# Patient Record
Sex: FEMALE | Race: WHITE | URBAN - METROPOLITAN AREA
[De-identification: names, ages, dates, MRNs, and addresses within clinical notes are randomized per-mention and may not be internally consistent; named-entity substitution may affect disease eponyms.]

---

## 2020-10-29 ENCOUNTER — OFFICE VISIT (OUTPATIENT)
Dept: URBAN - METROPOLITAN AREA CLINIC 27 | Facility: CLINIC | Age: 85
End: 2020-10-29
Payer: MEDICARE

## 2020-10-29 PROCEDURE — 92134 CPTRZ OPH DX IMG PST SGM RTA: CPT | Performed by: OPHTHALMOLOGY

## 2020-10-29 ASSESSMENT — INTRAOCULAR PRESSURE
OD: 9
OS: 10

## 2020-10-29 NOTE — IMPRESSION/PLAN
Impression: Exudative age-related macular degeneration, bilateral, with active choroidal neovascularization: H35.3231. OU.
s/p Avastin OU; last OS 9/3/2020, OD 9/3/2020 Plan: Exam and OCT continue to show stable IRF/SRF OU, resolved SRH OS s/p Avastin OU. I have recommended continuing anti-VEGF treatment OU today. R/B/A discussed. The patient elects to proceed with intravitreal Avastin OU today, which was performed in the clinic without complication. last full DFE OU 6/16/20 RTC 7-8 weeks OCT OU re-eval, DFE OU (semiannual)

## 2020-12-17 ENCOUNTER — OFFICE VISIT (OUTPATIENT)
Dept: URBAN - METROPOLITAN AREA CLINIC 27 | Facility: CLINIC | Age: 85
End: 2020-12-17
Payer: MEDICARE

## 2020-12-17 PROCEDURE — 92014 COMPRE OPH EXAM EST PT 1/>: CPT | Performed by: OPHTHALMOLOGY

## 2020-12-17 PROCEDURE — 92134 CPTRZ OPH DX IMG PST SGM RTA: CPT | Performed by: OPHTHALMOLOGY

## 2020-12-17 ASSESSMENT — INTRAOCULAR PRESSURE
OS: 7
OD: 9

## 2020-12-17 NOTE — IMPRESSION/PLAN
Impression: Exudative age-related macular degeneration, bilateral, with active choroidal neovascularization: H35.3231. OU.
s/p Avastin OU; last OS 10/29/2020, OD 10/29/2020 Plan: Both eyes are due for full dilated semiannual exam today. Exam and OCT continue to show stable, mild IRF/SRF OU, resolved SRH OS s/p Avastin OU. I have recommended continuing anti-VEGF treatment OU today. R/B/A discussed. The patient elects to proceed with intravitreal Avastin OU today, which was performed in the clinic without complication. 

RTC 8 weeks OCT OU re-eval Avastin

## 2021-02-11 ENCOUNTER — OFFICE VISIT (OUTPATIENT)
Dept: URBAN - METROPOLITAN AREA CLINIC 27 | Facility: CLINIC | Age: 86
End: 2021-02-11
Payer: MEDICARE

## 2021-02-11 PROCEDURE — 92134 CPTRZ OPH DX IMG PST SGM RTA: CPT | Performed by: OPHTHALMOLOGY

## 2021-02-11 ASSESSMENT — INTRAOCULAR PRESSURE
OS: 9
OD: 8

## 2021-02-11 NOTE — IMPRESSION/PLAN
Impression: Exudative age-related macular degeneration, bilateral, with active choroidal neovascularization: H35.3231. OU.
s/p Avastin OU; last OS 12/17/2020, OD 12/17/2020
last DFE OU 12/17/2020 Plan: Exam and OCT continue to show stable, mild IRF/SRF OU, resolved SRH OS s/p Avastin OU. I have recommended continuing anti-VEGF treatment OU with gradual extension of treatment interval as tolerated. R/B/A discussed. The patient elects to proceed with intravitreal Avastin OU today, which was performed in the clinic without complication. 

RTC 10 weeks OCT OU re-eval Avastin

## 2021-04-22 ENCOUNTER — OFFICE VISIT (OUTPATIENT)
Dept: URBAN - METROPOLITAN AREA CLINIC 27 | Facility: CLINIC | Age: 86
End: 2021-04-22
Payer: MEDICARE

## 2021-04-22 PROCEDURE — 67028 INJECTION EYE DRUG: CPT | Performed by: OPHTHALMOLOGY

## 2021-04-22 PROCEDURE — 92134 CPTRZ OPH DX IMG PST SGM RTA: CPT | Performed by: OPHTHALMOLOGY

## 2021-04-22 ASSESSMENT — INTRAOCULAR PRESSURE
OD: 8
OS: 8

## 2021-04-22 NOTE — IMPRESSION/PLAN
Impression: Exudative age-related macular degeneration, bilateral, with active choroidal neovascularization: H35.3231. OU.
s/p Avastin OU; last OS 2/11/21, OD 2/11/21
last DFE OU 12/17/2020 Plan: Exam and OCT demonstrate worsening IRF/SRF OD, resolved SRF OS,  s/p Avastin OU 10 weeks ago. I have recommended continuing anti-VEGF treatment OD only, reduce treatment interval to 8 weeks. R/B/A discussed. The patient elects to proceed with intravitreal Avastin OD today, which was performed in the clinic without complication. 

RTC 8 weeks OCT OU re-eval Avastin, DFE OU (semiannual)

## 2021-06-17 ENCOUNTER — OFFICE VISIT (OUTPATIENT)
Dept: URBAN - METROPOLITAN AREA CLINIC 27 | Facility: CLINIC | Age: 86
End: 2021-06-17
Payer: MEDICARE

## 2021-06-17 PROCEDURE — 92014 COMPRE OPH EXAM EST PT 1/>: CPT | Performed by: OPHTHALMOLOGY

## 2021-06-17 PROCEDURE — 92134 CPTRZ OPH DX IMG PST SGM RTA: CPT | Performed by: OPHTHALMOLOGY

## 2021-06-17 ASSESSMENT — INTRAOCULAR PRESSURE
OD: 8
OS: 9

## 2021-06-17 NOTE — IMPRESSION/PLAN
Impression: Exudative age-related macular degeneration, bilateral, with active choroidal neovascularization: H35.3231. OU.
s/p Avastin OU; last OS 2/11/21, OD 4/22/21
last DFE OU 12/17/2020 Plan: Both eyes are due for full dilated semiannual exam today. Exam and OCT demonstrate improving IRF/SRF OD, recurrent IRF OS,  s/p Avastin OD 8 weeks ago. I have recommended continuing anti-VEGF treatment OU q8 weeks. R/B/A discussed. The patient elects to proceed with intravitreal Avastin OU today, which was performed in the clinic without complication. Will consider switch to Lucentis at next visit if fluid persists. 

RTC 8 weeks OCT OU re-eval Avastin vs Lucentis 0.5

## 2021-08-12 ENCOUNTER — OFFICE VISIT (OUTPATIENT)
Dept: URBAN - METROPOLITAN AREA CLINIC 27 | Facility: CLINIC | Age: 86
End: 2021-08-12
Payer: MEDICARE

## 2021-08-12 DIAGNOSIS — Z96.1 PRESENCE OF INTRAOCULAR LENS: ICD-10-CM

## 2021-08-12 DIAGNOSIS — H35.3231 EXUDATIVE AGE-RELATED MACULAR DEGENERATION, BILATERAL, WITH ACTIVE CHOROIDAL NEOVASCULARIZATION: Primary | ICD-10-CM

## 2021-08-12 PROCEDURE — 92134 CPTRZ OPH DX IMG PST SGM RTA: CPT | Performed by: OPHTHALMOLOGY

## 2021-08-12 ASSESSMENT — INTRAOCULAR PRESSURE
OD: 10
OS: 9

## 2021-08-12 NOTE — IMPRESSION/PLAN
Impression: Exudative age-related macular degeneration, bilateral, with active choroidal neovascularization: H35.3231. OU.
s/p Avastin OU; last OS 6/17/21, OD 6/17/21
last DFE OU 6/17/21 Plan: Exam and OCT demonstrate improving IRF/SRF OU,  s/p Avastin OU 8 weeks ago. I have recommended continuing anti-VEGF treatment OU q8 weeks. R/B/A discussed. The patient elects to switch to intravitreal Lucentis 0.5 OU today, which was performed in the clinic without complication.  

RTC 8 weeks OCT OU re-eval Avastin vs Lucentis 0.5

## 2021-10-14 ENCOUNTER — OFFICE VISIT (OUTPATIENT)
Dept: URBAN - METROPOLITAN AREA CLINIC 27 | Facility: CLINIC | Age: 86
End: 2021-10-14
Payer: MEDICARE

## 2021-10-14 PROCEDURE — 92134 CPTRZ OPH DX IMG PST SGM RTA: CPT | Performed by: OPHTHALMOLOGY

## 2021-10-14 ASSESSMENT — INTRAOCULAR PRESSURE
OD: 8
OS: 9

## 2021-10-14 NOTE — IMPRESSION/PLAN
Impression: Exudative age-related macular degeneration, bilateral, with active choroidal neovascularization: H35.3231. OU.
s/p Lucentis 0.5 OU, last 08/12/2021
s/p Avastin OU; last OS 6/17/21, OD 6/17/21
last DFE OU 6/17/21 Plan: Exam and OCT reveal IRF/SRF OU, worse OD and stable OS, s/p Lucentis 0.5 OU 9  weeks ago. I have recommended continuing anti-VEGF treatment OU q8 weeks. R/B/A discussed. The patient elects to continue intravitreal Lucentis 0.5 OU,, which was performed in the clinic without complication.  

RTC 8 weeks OCT OU re-eval Avastin vs Lucentis 0.5, DFE OU (semiannual)

## 2021-12-09 ENCOUNTER — OFFICE VISIT (OUTPATIENT)
Dept: URBAN - METROPOLITAN AREA CLINIC 27 | Facility: CLINIC | Age: 86
End: 2021-12-09
Payer: MEDICARE

## 2021-12-09 PROCEDURE — 92134 CPTRZ OPH DX IMG PST SGM RTA: CPT | Performed by: OPHTHALMOLOGY

## 2021-12-09 PROCEDURE — 92014 COMPRE OPH EXAM EST PT 1/>: CPT | Performed by: OPHTHALMOLOGY

## 2021-12-09 ASSESSMENT — INTRAOCULAR PRESSURE
OD: 12
OS: 13

## 2021-12-09 NOTE — IMPRESSION/PLAN
Impression: Exudative age-related macular degeneration, bilateral, with active choroidal neovascularization: H35.3231. OU.
s/p Lucentis 0.5 OU, last 10/14/2021
s/p Avastin OU; last OS 6/17/21, OD 6/17/21
last DFE OU 6/17/21 Plan: Both eyes are due for full dilated semiannual exam today. Exam and OCT reveal IRF/SRF OU, improving OD and stable OS, s/p Lucentis 0.5 OU 8 weeks ago. Recent history of worsening IRF OD with extension to 9 weeks. I have recommended continuing anti-VEGF treatment OU q8 weeks. R/B/A discussed. The patient elects to continue intravitreal Lucentis 0.5 OU,, which was performed in the clinic without complication.  

RTC 8 weeks OCT OU re-eval Avastin vs Lucentis 0.5

## 2022-02-03 ENCOUNTER — OFFICE VISIT (OUTPATIENT)
Dept: URBAN - METROPOLITAN AREA CLINIC 41 | Facility: CLINIC | Age: 87
End: 2022-02-03
Payer: MEDICARE

## 2022-02-03 PROCEDURE — 92134 CPTRZ OPH DX IMG PST SGM RTA: CPT | Performed by: OPHTHALMOLOGY

## 2022-02-03 ASSESSMENT — INTRAOCULAR PRESSURE
OD: 9
OS: 9

## 2022-02-03 NOTE — IMPRESSION/PLAN
Impression: Exudative age-related macular degeneration, bilateral, with active choroidal neovascularization: H35.3231. OU.
s/p Lucentis 0.5 OU, last 12/09/2021
s/p Avastin OU; last OS 6/17/21, OD 6/17/21
last DFE OU 12/09/21 Plan: Exam and OCT demonstrate resolved IRF/SRF OU, s/p Lucentis 0.5 OU 8 weeks ago. History of worsening IRF OD with extension to 9 weeks. I have recommended continuing anti-VEGF treatment OU. R/B/A discussed. The patient elects to continue intravitreal Lucentis 0.5 OU, which was performed in the clinic without complication. Pt would like to try 9 week extension again. 

RTC 9 weeks OCT OU Lucentis 0.5 OU (#3/3), treat and extend

## 2022-04-06 ENCOUNTER — PROCEDURE (OUTPATIENT)
Dept: URBAN - METROPOLITAN AREA CLINIC 41 | Facility: CLINIC | Age: 87
End: 2022-04-06
Payer: MEDICARE

## 2022-04-06 PROCEDURE — 92134 CPTRZ OPH DX IMG PST SGM RTA: CPT | Performed by: OPHTHALMOLOGY

## 2022-04-06 ASSESSMENT — INTRAOCULAR PRESSURE
OD: 10
OS: 10

## 2022-04-06 NOTE — IMPRESSION/PLAN
Impression: Exudative age-related macular degeneration, bilateral, with active choroidal neovascularization: H35.3231. OU.
s/p Lucentis 0.5 OU, last 02/03/2022
s/p Avastin OU; last OS 6/17/21, OD 6/17/21
last DFE OU 12/09/21 Plan: OCT: resolved IRF/SRF OU, s/p Lucentis 0.5 OU 9 weeks ago. History of worsening IRF OD with extension. I have recommended continuing anti-VEGF treatment OU. Pt would like to try extension to 10 weeks. R/B/A discussed. The patient elects to continue intravitreal Lucentis 0.5 OU, which was performed in the clinic without complication.  

RTC 10 weeks OCT OU Re-Eval Lucentis 0.5 OU, dilated exam OU (semiannual)

## 2022-06-15 ENCOUNTER — OFFICE VISIT (OUTPATIENT)
Dept: URBAN - METROPOLITAN AREA CLINIC 41 | Facility: CLINIC | Age: 87
End: 2022-06-15
Payer: MEDICARE

## 2022-06-15 DIAGNOSIS — H35.3231 EXUDATIVE AGE-RELATED MACULAR DEGENERATION, BILATERAL, WITH ACTIVE CHOROIDAL NEOVASCULARIZATION: Primary | ICD-10-CM

## 2022-06-15 DIAGNOSIS — Z96.1 PRESENCE OF INTRAOCULAR LENS: ICD-10-CM

## 2022-06-15 PROCEDURE — 92134 CPTRZ OPH DX IMG PST SGM RTA: CPT | Performed by: OPHTHALMOLOGY

## 2022-06-15 PROCEDURE — 92014 COMPRE OPH EXAM EST PT 1/>: CPT | Performed by: OPHTHALMOLOGY

## 2022-06-15 ASSESSMENT — INTRAOCULAR PRESSURE
OS: 9
OD: 8

## 2022-06-15 NOTE — IMPRESSION/PLAN
Impression: Exudative age-related macular degeneration, bilateral, with active choroidal neovascularization: H35.3231. OU.
s/p Lucentis 0.5 OU, last 04/06/2022
s/p Avastin OU; last OS 6/17/21, OD 6/17/21
last DFE OU 12/09/21 Plan: Both eyes are due for full dilated exam today. Exam and OCT show resolved IRF/SRF OU, s/p Lucentis 0.5 OU 10 weeks ago. History of worsening IRF OD with extension. I have recommended continuing anti-VEGF treatment OU. Pt would like to try extension to 12 weeks. R/B/A discussed. The patient elects to continue intravitreal Lucentis 0.5 OU, which was performed in the clinic without complication.  

RTC 12 weeks OCT OU re-eval for Lucentis 0.5 OU

## 2022-09-07 ENCOUNTER — OFFICE VISIT (OUTPATIENT)
Dept: URBAN - METROPOLITAN AREA CLINIC 41 | Facility: CLINIC | Age: 87
End: 2022-09-07
Payer: MEDICARE

## 2022-09-07 DIAGNOSIS — H35.3231 EXUDATIVE AGE-RELATED MACULAR DEGENERATION, BILATERAL, WITH ACTIVE CHOROIDAL NEOVASCULARIZATION: Primary | ICD-10-CM

## 2022-09-07 DIAGNOSIS — Z96.1 PRESENCE OF INTRAOCULAR LENS: ICD-10-CM

## 2022-09-07 PROCEDURE — 99213 OFFICE O/P EST LOW 20 MIN: CPT | Performed by: OPHTHALMOLOGY

## 2022-09-07 PROCEDURE — 92134 CPTRZ OPH DX IMG PST SGM RTA: CPT | Performed by: OPHTHALMOLOGY

## 2022-09-07 ASSESSMENT — INTRAOCULAR PRESSURE
OS: 5
OD: 6

## 2022-09-07 NOTE — IMPRESSION/PLAN
Impression: Exudative age-related macular degeneration, bilateral, with active choroidal neovascularization: H35.3231. OU.
s/p Lucentis 0.5 OU, last 04/06/2022
s/p Avastin OU; last OS 6/15/22, OD 6/15/22 Plan: Exam and OCT show recurrent IRF OD, mild SRF OS, s/p Lucentis 0.5 OU 12 weeks ago. I have recommended continuing anti-VEGF treatment OU. Pt would like to reduce treatment interval back to 10 weeks. R/B/A discussed. The patient elects to continue intravitreal Lucentis 0.5 OU, which was performed in the clinic without complication.  

RTC 10 weeks OCT OU Lucentis 0.5 Ou #2/3

## 2022-11-16 ENCOUNTER — PROCEDURE (OUTPATIENT)
Dept: URBAN - METROPOLITAN AREA CLINIC 41 | Facility: CLINIC | Age: 87
End: 2022-11-16
Payer: MEDICARE

## 2022-11-16 DIAGNOSIS — H35.3231 EXUDATIVE AGE-RELATED MACULAR DEGENERATION, BILATERAL, WITH ACTIVE CHOROIDAL NEOVASCULARIZATION: Primary | ICD-10-CM

## 2022-11-16 PROCEDURE — 92134 CPTRZ OPH DX IMG PST SGM RTA: CPT | Performed by: OPHTHALMOLOGY

## 2022-11-16 ASSESSMENT — INTRAOCULAR PRESSURE
OD: 14
OS: 14

## 2022-11-16 NOTE — IMPRESSION/PLAN
Impression: Exudative age-related macular degeneration, bilateral, with active choroidal neovascularization: H35.3231. OU.
s/p Lucentis 0.5 OU, last 09/07/2022
s/p Avastin OU; last OS 6/15/22, OD 6/15/22 Plan: Exam and OCT show improving IRF OD, stable SRF OS, s/p Lucentis 0.5 OU 10 weeks ago. I have recommended continuing anti-VEGF treatment OU q10 weeks. R/B/A discussed. The patient elects to continue intravitreal Lucentis 0.5 OU, which was performed in the clinic without complication.  

RTC 10 weeks OCT OU Lucentis 0.5 OU #3/3

## 2023-01-25 ENCOUNTER — PROCEDURE (OUTPATIENT)
Dept: URBAN - METROPOLITAN AREA CLINIC 41 | Facility: CLINIC | Age: 88
End: 2023-01-25
Payer: MEDICARE

## 2023-01-25 DIAGNOSIS — H35.3231 EXUDATIVE AGE-RELATED MACULAR DEGENERATION, BILATERAL, WITH ACTIVE CHOROIDAL NEOVASCULARIZATION: Primary | ICD-10-CM

## 2023-01-25 PROCEDURE — 67028 INJECTION EYE DRUG: CPT | Performed by: OPHTHALMOLOGY

## 2023-01-25 PROCEDURE — 92134 CPTRZ OPH DX IMG PST SGM RTA: CPT | Performed by: OPHTHALMOLOGY

## 2023-01-25 ASSESSMENT — INTRAOCULAR PRESSURE
OD: 7
OS: 8

## 2023-01-25 NOTE — IMPRESSION/PLAN
Impression: Exudative age-related macular degeneration, bilateral, with active choroidal neovascularization: H35.3231. OU.
s/p Lucentis 0.5 OU, last 11/16/2022
s/p Avastin OU; last OS 6/15/22, OD 6/15/22 Plan: OCT: stable IRF OD, resolved SRF OS, s/p Lucentis 0.5 OU 10 weeks ago. I have recommended continuing anti-VEGF treatment OD only q10 weeks. R/B/A discussed. The patient elects to continue intravitreal Lucentis 0.5 OD, which was performed in the clinic without complication.  

RTC 10 weeks OCT OU Re-Eval  Lucentis 0.5 OU

## 2023-04-05 ENCOUNTER — OFFICE VISIT (OUTPATIENT)
Dept: URBAN - METROPOLITAN AREA CLINIC 41 | Facility: CLINIC | Age: 88
End: 2023-04-05
Payer: MEDICARE

## 2023-04-05 DIAGNOSIS — Z96.1 PRESENCE OF INTRAOCULAR LENS: ICD-10-CM

## 2023-04-05 DIAGNOSIS — H35.3231 EXUDATIVE AGE-RELATED MACULAR DEGENERATION, BILATERAL, WITH ACTIVE CHOROIDAL NEOVASCULARIZATION: Primary | ICD-10-CM

## 2023-04-05 PROCEDURE — 92014 COMPRE OPH EXAM EST PT 1/>: CPT | Performed by: OPHTHALMOLOGY

## 2023-04-05 PROCEDURE — 92134 CPTRZ OPH DX IMG PST SGM RTA: CPT | Performed by: OPHTHALMOLOGY

## 2023-04-05 ASSESSMENT — INTRAOCULAR PRESSURE
OS: 8
OD: 9

## 2023-04-05 NOTE — IMPRESSION/PLAN
Impression: Exudative age-related macular degeneration, bilateral, with active choroidal neovascularization: H35.3231. OU.
s/p Lucentis 0.5 OU, last 1/25/23
s/p Avastin OU; last OS 6/15/22, OD 6/15/22 Plan: Exam and OCT show stable IRF OD, recurrent IRF and SRH OS, s/p Lucentis 0.5 OD 10 weeks ago. I have recommended continuing anti-VEGF treatment OD and restart anti-VEGF treatment OS. R/B/A discussed. The patient elects to proceed with intravitreal Lucentis 0.5 OU, which was performed in the clinic without complication.  

RTC 10 weeks OCT OU Lucentis 0.5 OU #2/3

## 2023-06-14 ENCOUNTER — PROCEDURE (OUTPATIENT)
Dept: URBAN - METROPOLITAN AREA CLINIC 27 | Facility: CLINIC | Age: 88
End: 2023-06-14
Payer: MEDICARE

## 2023-06-14 DIAGNOSIS — H35.3231 EXUDATIVE AGE-RELATED MACULAR DEGENERATION, BILATERAL, WITH ACTIVE CHOROIDAL NEOVASCULARIZATION: Primary | ICD-10-CM

## 2023-06-14 PROCEDURE — 92134 CPTRZ OPH DX IMG PST SGM RTA: CPT | Performed by: OPHTHALMOLOGY

## 2023-06-14 ASSESSMENT — INTRAOCULAR PRESSURE
OS: 10
OD: 10

## 2023-06-14 NOTE — IMPRESSION/PLAN
Impression: Exudative age-related macular degeneration, bilateral, with active choroidal neovascularization: H35.3231. OU.
s/p Lucentis 0.5 OU, last 04/05/23
s/p Avastin OU; last OS 6/15/22, OD 6/15/22 Plan: OCT: worsening IRF OU, s/p Lucentis 0.5 OU 10 weeks ago. I have recommended continuing anti-VEGF treatment OU. R/B/A discussed. The patient elects to proceed with intravitreal Lucentis 0.5 OU, which was performed in the clinic without complication. Will switch to Vabysmo at follow-up if approved. 

RTC 8 weeks OCT OU Lucentis 0.5 vs Vabysmo OU #3/3

## 2023-08-10 ENCOUNTER — PROCEDURE (OUTPATIENT)
Dept: URBAN - METROPOLITAN AREA CLINIC 27 | Facility: CLINIC | Age: 88
End: 2023-08-10
Payer: MEDICARE

## 2023-08-10 DIAGNOSIS — H35.3231 EXUDATIVE AGE-RELATED MACULAR DEGENERATION, BILATERAL, WITH ACTIVE CHOROIDAL NEOVASCULARIZATION: Primary | ICD-10-CM

## 2023-08-10 PROCEDURE — 92134 CPTRZ OPH DX IMG PST SGM RTA: CPT | Performed by: OPHTHALMOLOGY

## 2023-08-10 ASSESSMENT — INTRAOCULAR PRESSURE
OS: 9
OD: 8

## 2023-10-05 ENCOUNTER — OFFICE VISIT (OUTPATIENT)
Dept: URBAN - METROPOLITAN AREA CLINIC 27 | Facility: CLINIC | Age: 88
End: 2023-10-05
Payer: MEDICARE

## 2023-10-05 DIAGNOSIS — H35.3231 EXUDATIVE AGE-RELATED MACULAR DEGENERATION, BILATERAL, WITH ACTIVE CHOROIDAL NEOVASCULARIZATION: Primary | ICD-10-CM

## 2023-10-05 DIAGNOSIS — Z96.1 PRESENCE OF INTRAOCULAR LENS: ICD-10-CM

## 2023-10-05 PROCEDURE — 92134 CPTRZ OPH DX IMG PST SGM RTA: CPT | Performed by: OPHTHALMOLOGY

## 2023-10-05 PROCEDURE — 92014 COMPRE OPH EXAM EST PT 1/>: CPT | Performed by: OPHTHALMOLOGY

## 2023-10-05 ASSESSMENT — INTRAOCULAR PRESSURE
OS: 14
OD: 13

## 2023-12-14 ENCOUNTER — OFFICE VISIT (OUTPATIENT)
Dept: URBAN - METROPOLITAN AREA CLINIC 27 | Facility: CLINIC | Age: 88
End: 2023-12-14
Payer: MEDICARE

## 2023-12-14 DIAGNOSIS — H35.3231 EXUDATIVE AGE-RELATED MACULAR DEGENERATION, BILATERAL, WITH ACTIVE CHOROIDAL NEOVASCULARIZATION: Primary | ICD-10-CM

## 2023-12-14 PROCEDURE — 92134 CPTRZ OPH DX IMG PST SGM RTA: CPT | Performed by: OPHTHALMOLOGY

## 2023-12-14 ASSESSMENT — INTRAOCULAR PRESSURE
OD: 15
OS: 14

## 2024-02-22 ENCOUNTER — OFFICE VISIT (OUTPATIENT)
Dept: URBAN - METROPOLITAN AREA CLINIC 27 | Facility: CLINIC | Age: 89
End: 2024-02-22
Payer: MEDICARE

## 2024-02-22 DIAGNOSIS — H35.3231 EXUDATIVE AGE-RELATED MACULAR DEGENERATION, BILATERAL, WITH ACTIVE CHOROIDAL NEOVASCULARIZATION: Primary | ICD-10-CM

## 2024-02-22 PROCEDURE — 92134 CPTRZ OPH DX IMG PST SGM RTA: CPT | Performed by: OPHTHALMOLOGY

## 2024-02-22 ASSESSMENT — INTRAOCULAR PRESSURE
OD: 10
OS: 10

## 2024-05-02 ENCOUNTER — OFFICE VISIT (OUTPATIENT)
Dept: URBAN - METROPOLITAN AREA CLINIC 27 | Facility: CLINIC | Age: 89
End: 2024-05-02
Payer: MEDICARE

## 2024-05-02 DIAGNOSIS — H43.813 VITREOUS DEGENERATION, BILATERAL: ICD-10-CM

## 2024-05-02 DIAGNOSIS — Z96.1 PRESENCE OF INTRAOCULAR LENS: ICD-10-CM

## 2024-05-02 DIAGNOSIS — H35.3231 EXUDATIVE AGE-RELATED MACULAR DEGENERATION, BILATERAL, WITH ACTIVE CHOROIDAL NEOVASCULARIZATION: Primary | ICD-10-CM

## 2024-05-02 PROCEDURE — 92134 CPTRZ OPH DX IMG PST SGM RTA: CPT | Performed by: OPHTHALMOLOGY

## 2024-05-02 PROCEDURE — 92014 COMPRE OPH EXAM EST PT 1/>: CPT | Performed by: OPHTHALMOLOGY

## 2024-05-02 ASSESSMENT — INTRAOCULAR PRESSURE
OS: 11
OD: 10

## 2024-07-11 ENCOUNTER — OFFICE VISIT (OUTPATIENT)
Dept: URBAN - METROPOLITAN AREA CLINIC 27 | Facility: CLINIC | Age: 89
End: 2024-07-11
Payer: MEDICARE

## 2024-07-11 DIAGNOSIS — H35.3231 EXUDATIVE AGE-RELATED MACULAR DEGENERATION, BILATERAL, WITH ACTIVE CHOROIDAL NEOVASCULARIZATION: Primary | ICD-10-CM

## 2024-07-11 PROCEDURE — 92134 CPTRZ OPH DX IMG PST SGM RTA: CPT | Performed by: OPHTHALMOLOGY

## 2024-07-11 ASSESSMENT — INTRAOCULAR PRESSURE
OD: 11
OS: 10

## 2024-09-19 ENCOUNTER — OFFICE VISIT (OUTPATIENT)
Dept: URBAN - METROPOLITAN AREA CLINIC 27 | Facility: CLINIC | Age: 89
End: 2024-09-19
Payer: MEDICARE

## 2024-09-19 DIAGNOSIS — H35.3231 EXUDATIVE AGE-RELATED MACULAR DEGENERATION, BILATERAL, WITH ACTIVE CHOROIDAL NEOVASCULARIZATION: Primary | ICD-10-CM

## 2024-09-19 PROCEDURE — 92134 CPTRZ OPH DX IMG PST SGM RTA: CPT | Performed by: OPHTHALMOLOGY

## 2024-09-19 ASSESSMENT — INTRAOCULAR PRESSURE
OS: 10
OD: 14

## 2024-12-05 ENCOUNTER — OFFICE VISIT (OUTPATIENT)
Dept: URBAN - METROPOLITAN AREA CLINIC 41 | Facility: CLINIC | Age: 89
End: 2024-12-05
Payer: MEDICARE

## 2024-12-05 DIAGNOSIS — Z96.1 PRESENCE OF INTRAOCULAR LENS: ICD-10-CM

## 2024-12-05 DIAGNOSIS — H35.3231 EXUDATIVE AGE-RELATED MACULAR DEGENERATION, BILATERAL, WITH ACTIVE CHOROIDAL NEOVASCULARIZATION: Primary | ICD-10-CM

## 2024-12-05 DIAGNOSIS — H43.813 VITREOUS DEGENERATION, BILATERAL: ICD-10-CM

## 2024-12-05 PROCEDURE — 92134 CPTRZ OPH DX IMG PST SGM RTA: CPT | Performed by: OPHTHALMOLOGY

## 2024-12-05 PROCEDURE — 92014 COMPRE OPH EXAM EST PT 1/>: CPT | Performed by: OPHTHALMOLOGY

## 2024-12-05 ASSESSMENT — INTRAOCULAR PRESSURE
OS: 8
OD: 10

## 2025-02-13 ENCOUNTER — OFFICE VISIT (OUTPATIENT)
Dept: URBAN - METROPOLITAN AREA CLINIC 41 | Facility: CLINIC | Age: OVER 89
End: 2025-02-13
Payer: MEDICARE

## 2025-02-13 DIAGNOSIS — H35.3231 EXUDATIVE AGE-RELATED MACULAR DEGENERATION, BILATERAL, WITH ACTIVE CHOROIDAL NEOVASCULARIZATION: Primary | ICD-10-CM

## 2025-02-13 PROCEDURE — 92134 CPTRZ OPH DX IMG PST SGM RTA: CPT | Performed by: OPHTHALMOLOGY

## 2025-02-13 ASSESSMENT — INTRAOCULAR PRESSURE
OS: 10
OD: 9

## 2025-05-14 ENCOUNTER — OFFICE VISIT (OUTPATIENT)
Dept: URBAN - METROPOLITAN AREA CLINIC 27 | Facility: CLINIC | Age: OVER 89
End: 2025-05-14
Payer: MEDICARE

## 2025-05-14 DIAGNOSIS — Z96.1 PRESENCE OF INTRAOCULAR LENS: ICD-10-CM

## 2025-05-14 DIAGNOSIS — H04.552 ACQUIRED STENOSIS OF LEFT NASOLACRIMAL DUCT: ICD-10-CM

## 2025-05-14 DIAGNOSIS — H43.813 VITREOUS DEGENERATION, BILATERAL: ICD-10-CM

## 2025-05-14 DIAGNOSIS — H35.3231 EXUDATIVE AGE-RELATED MACULAR DEGENERATION, BILATERAL, WITH ACTIVE CHOROIDAL NEOVASCULARIZATION: Primary | ICD-10-CM

## 2025-05-14 PROCEDURE — 92134 CPTRZ OPH DX IMG PST SGM RTA: CPT | Performed by: OPHTHALMOLOGY

## 2025-05-14 PROCEDURE — 99214 OFFICE O/P EST MOD 30 MIN: CPT | Performed by: OPHTHALMOLOGY

## 2025-05-14 ASSESSMENT — INTRAOCULAR PRESSURE
OS: 14
OD: 14

## 2025-07-17 ENCOUNTER — OFFICE VISIT (OUTPATIENT)
Dept: URBAN - METROPOLITAN AREA CLINIC 41 | Facility: CLINIC | Age: OVER 89
End: 2025-07-17
Payer: MEDICARE

## 2025-07-17 DIAGNOSIS — H35.3231 EXUDATIVE AGE-RELATED MACULAR DEGENERATION, BILATERAL, WITH ACTIVE CHOROIDAL NEOVASCULARIZATION: Primary | ICD-10-CM

## 2025-07-17 PROCEDURE — 92134 CPTRZ OPH DX IMG PST SGM RTA: CPT | Performed by: OPHTHALMOLOGY

## 2025-07-17 ASSESSMENT — INTRAOCULAR PRESSURE
OD: 10
OS: 11

## 2025-08-21 ENCOUNTER — OFFICE VISIT (OUTPATIENT)
Dept: URBAN - METROPOLITAN AREA CLINIC 32 | Facility: CLINIC | Age: OVER 89
End: 2025-08-21
Payer: MEDICARE

## 2025-08-21 DIAGNOSIS — H04.222 EPIPHORA DUE TO INSUFFICIENT DRAINAGE, LEFT LACRIMAL GLAND: Primary | ICD-10-CM

## 2025-08-21 PROCEDURE — 99203 OFFICE O/P NEW LOW 30 MIN: CPT | Performed by: OPHTHALMOLOGY
